# Patient Record
Sex: MALE | Race: ASIAN
[De-identification: names, ages, dates, MRNs, and addresses within clinical notes are randomized per-mention and may not be internally consistent; named-entity substitution may affect disease eponyms.]

---
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
## 2022-06-20 ENCOUNTER — HOSPITAL ENCOUNTER (EMERGENCY)
Dept: HOSPITAL 47 - EC | Age: 22
Discharge: HOME | End: 2022-06-20
Payer: COMMERCIAL

## 2022-06-20 VITALS
HEART RATE: 79 BPM | SYSTOLIC BLOOD PRESSURE: 129 MMHG | DIASTOLIC BLOOD PRESSURE: 79 MMHG | RESPIRATION RATE: 20 BRPM | TEMPERATURE: 98.7 F

## 2022-06-20 DIAGNOSIS — U07.1: Primary | ICD-10-CM

## 2022-06-20 DIAGNOSIS — J02.8: ICD-10-CM

## 2022-06-20 DIAGNOSIS — E66.9: ICD-10-CM

## 2022-06-20 PROCEDURE — 87502 INFLUENZA DNA AMP PROBE: CPT

## 2022-06-20 PROCEDURE — 87081 CULTURE SCREEN ONLY: CPT

## 2022-06-20 PROCEDURE — 99283 EMERGENCY DEPT VISIT LOW MDM: CPT

## 2022-06-20 PROCEDURE — 71046 X-RAY EXAM CHEST 2 VIEWS: CPT

## 2022-06-20 PROCEDURE — 87635 SARS-COV-2 COVID-19 AMP PRB: CPT

## 2022-06-20 PROCEDURE — 87430 STREP A AG IA: CPT

## 2022-06-20 NOTE — XR
EXAM:

  XR Chest, 2 Views

 

CLINICAL HISTORY:

  ITS.REASON XR Reason: Cough/pain

 

TECHNIQUE:

  Frontal and lateral views of the chest.

 

COMPARISON:

  No relevant prior studies available.

 

FINDINGS:

  Lungs:  Unremarkable.  No infiltration, atelectasis or mass density.

  Pleural space:  Unremarkable.  No pneumothorax. No pleural fluid.

  Heart:  Unremarkable.  No cardiomegaly.

  Mediastinum:  Unremarkable.

  Bones/joints:  Unremarkable. No acute abnormalities.

 

IMPRESSION:     

No evidence of acute or active process in the chest.

## 2022-06-20 NOTE — ED
Fever HPI





- General


Chief Complaint: Fever


Stated Complaint: fever, lethargic


Source: patient, family


Mode of arrival: ambulatory


Limitations: no limitations





- History of Present Illness


Initial Comments: 





22-year-old male presents emergency department with fever, sore throat and 

muscle aches.  States that his symptoms just started today.  He denies having 

any direct sick contacts.  Admits to ear pain, scratchy throat, nonproductive 

cough.  He denies chest pain or difficulty breathing.  No abdominal pain.  No 

nausea, vomiting or diarrhea.  He is vaccinated against Covid and has had a 

booster.  Patient did not take any Motrin or Tylenol for fever control prior to 

coming to the hospital.  Patient denies any previous medical history.  No other 

alleviating, precipitating or modifying factors





- Related Data


                                  Previous Rx's











 Medication  Instructions  Recorded


 


Nirmatrelvir/Ritonavir [Paxlovid 1 each PO BID #1 pack 06/20/22





2X150 mg-100 mg ([facility])]  


 


Lidocaine Viscous [Xylocaine 15 ml PO Q4H PRN #100 ml 06/23/22





Viscous 2%]  











                                    Allergies











Allergy/AdvReac Type Severity Reaction Status Date / Time


 


No Known Allergies Allergy   Verified 06/20/22 00:43














Review of Systems


ROS Statement: 


Those systems with pertinent positive or pertinent negative responses have been 

documented in the HPI.





ROS Other: All systems not noted in ROS Statement are negative.





Past Medical History


Past Medical History: No Reported History


History of Any Multi-Drug Resistant Organisms: None Reported


Past Surgical History: No Surgical Hx Reported


Past Psychological History: No Psychological Hx Reported


Smoking Status: Never smoker


Past Alcohol Use History: None Reported


Past Drug Use History: None Reported





General Exam


Limitations: no limitations


General appearance: alert, in no apparent distress, obese


Head exam: Present: atraumatic, normocephalic, normal inspection


Eye exam: Present: normal appearance, PERRL, EOMI.  Absent: scleral icterus, 

conjunctival injection, periorbital swelling


ENT exam: Present: normal exam, mucous membranes moist, other (tonsils and uvula

are absent)


Neck exam: Present: normal inspection.  Absent: tenderness, meningismus, 

lymphadenopathy


Respiratory exam: Present: normal lung sounds bilaterally.  Absent: respiratory 

distress, wheezes, rales, rhonchi, stridor


Cardiovascular Exam: Present: regular rate, normal rhythm, normal heart sounds. 

Absent: systolic murmur, diastolic murmur, rubs, gallop, clicks


GI/Abdominal exam: Present: soft, normal bowel sounds.  Absent: distended, 

tenderness, guarding, rebound, rigid


Extremities exam: Present: normal inspection, full ROM, normal capillary refill.

 Absent: tenderness, pedal edema, joint swelling, calf tenderness


Back exam: Present: normal inspection


Neurological exam: Present: alert, oriented X3, CN II-XII intact


Psychiatric exam: Present: normal affect, normal mood


Skin exam: Present: warm, dry, intact, normal color.  Absent: rash





Course


                                   Vital Signs











  06/20/22 06/20/22





  00:37 03:15


 


Temperature 99.7 F H 98.7 F


 


Pulse Rate 86 79


 


Respiratory 18 20





Rate  


 


Blood Pressure 123/75 129/79


 


O2 Sat by Pulse 97 97





Oximetry  














Medical Decision Making





- Medical Decision Making





Upon arrival patient is placed into room 9.  He is given Motrin for fever 

control.  Patient is swabbed for Covid influenza as well as strep.  Covid is 

positive.  Chest x-ray is performed which shows no acute process.  Patient is 

given antibody infusion.  Family is additionally requesting prescription for 

Paxlovid.  Symptoms controlled at this time.  No signs of respiratory distress. 

Patient will be discharged home and instructed follow up with primary care 

doctor in 2-4 days return for any new or worsening symptoms per patient was 

discharged home in stable condition





- Lab Data


                                   Lab Results











  06/20/22 06/20/22 06/20/22 Range/Units





  01:15 01:15 01:15 


 


Coronavirus (PCR)   Detected A   (Not Detectd)  


 


Influenza Type A RNA  Not Detected    (Not Detectd)  


 


Influenza Type B (PCR)  Not Detected    (Not Detectd)  


 


Group A Strep Rapid    Negative  (Negative)  














Disposition


Clinical Impression: 


 Pyrexia, COVID-19, Pharyngitis





Disposition: HOME SELF-CARE


Condition: Stable


Instructions (If sedation given, give patient instructions):  Coronavirus 

Disease 2019 (COVID-19)


Additional Instructions: 


Please alternates taking Motrin and Tylenol for fever and body aches.  Follow-up

with primary care doctor in 2-4 days and return for any new or worsening 

symptoms


Prescriptions: 


Nirmatrelvir/Ritonavir [Paxlovid 2X150 mg-100 mg ([facility])] 1 each PO BID #1 pack


Is patient prescribed a controlled substance at d/c from ED?: No


Referrals: 


Ramsey Rosas MD [Primary Care Provider] - 1-2 days


Time of Disposition: 02:38

## 2022-06-23 ENCOUNTER — HOSPITAL ENCOUNTER (EMERGENCY)
Dept: HOSPITAL 47 - EC | Age: 22
Discharge: HOME | End: 2022-06-23
Payer: COMMERCIAL

## 2022-06-23 VITALS
TEMPERATURE: 98 F | HEART RATE: 83 BPM | DIASTOLIC BLOOD PRESSURE: 83 MMHG | SYSTOLIC BLOOD PRESSURE: 123 MMHG | RESPIRATION RATE: 18 BRPM

## 2022-06-23 DIAGNOSIS — U07.1: Primary | ICD-10-CM

## 2022-06-23 DIAGNOSIS — J02.9: ICD-10-CM

## 2022-06-23 PROCEDURE — 87081 CULTURE SCREEN ONLY: CPT

## 2022-06-23 PROCEDURE — 87430 STREP A AG IA: CPT

## 2022-06-23 PROCEDURE — 99283 EMERGENCY DEPT VISIT LOW MDM: CPT

## 2022-06-23 NOTE — ED
General Adult HPI





- General


Chief complaint: ENT


Stated complaint: Covid+, sore throat


Time Seen by Provider: 06/23/22 06:50


Source: patient, family, RN notes reviewed, old records reviewed


Mode of arrival: ambulatory


Limitations: no limitations





- History of Present Illness


Initial comments: 





Well-appearing 22-year-old male presents to the emergency room with his father 

complaining of sore throat, increased mucus, left ear pain and difficulty 

sleeping.  He was diagnosed with coronavirus on June 20 and received monoclonal 

antibodies infusion here.  He did take Tylenol prior to arrival. 


-: days(s) (3)


Severity scale (1-10): 8


Quality: constant


Consistency: constant


Improves with: none


Associated Symptoms: other (left ear pain)


Treatments Prior to Arrival: other (tylenol )





- Related Data


                                  Previous Rx's











 Medication  Instructions  Recorded


 


Nirmatrelvir/Ritonavir [Paxlovid 1 each PO BID #1 pack 06/20/22





2X150 mg-100 mg ([facility])]  


 


Lidocaine Viscous [Xylocaine 15 ml PO Q4H PRN #100 ml 06/23/22





Viscous 2%]  











                                    Allergies











Allergy/AdvReac Type Severity Reaction Status Date / Time


 


No Known Allergies Allergy   Verified 06/20/22 00:43














Review of Systems


ROS Statement: 


Those systems with pertinent positive or pertinent negative responses have been 

documented in the HPI.





ROS Other: All systems not noted in ROS Statement are negative.





Past Medical History


Past Medical History: No Reported History


History of Any Multi-Drug Resistant Organisms: None Reported


Past Surgical History: No Surgical Hx Reported


Past Psychological History: No Psychological Hx Reported


Smoking Status: Never smoker


Past Alcohol Use History: None Reported


Past Drug Use History: None Reported





General Exam


Limitations: no limitations


General appearance: alert


Head exam: Present: atraumatic


Eye exam: Present: normal appearance.  Absent: scleral icterus, conjunctival 

injection, periorbital swelling


ENT exam: Present: mucous membranes moist, TM's normal bilaterally, normal 

external ear exam


  ** Expanded


Mouth exam: Present: tongue normal, tongue elevation.  Absent: drooling, 

trismus, muffled voice


Throat exam: other (Erythematous oropharynx).  negative: tonsillar exudate, R 

peritonsillar mass, L peritonsillar mass


Neck exam: Present: normal inspection, full ROM.  Absent: tenderness, 

meningismus, lymphadenopathy


Respiratory exam: Present: normal lung sounds bilaterally.  Absent: respiratory 

distress, accessory muscle use


Cardiovascular Exam: Present: regular rate


Extremities exam: Present: normal capillary refill


Back exam: Present: normal inspection.  Absent: tenderness, CVA tenderness (R), 

CVA tenderness (L), rash noted


Neurological exam: Present: alert, oriented X3


Psychiatric exam: Present: normal affect, normal mood


Skin exam: Present: warm, dry, normal color.  Absent: cyanosis, diaphoretic, 

petechiae, pallor





Course


                                   Vital Signs











  06/23/22





  06:20


 


Temperature 98 F


 


Pulse Rate 83


 


Respiratory 18





Rate 


 


Blood Pressure 123/83


 


O2 Sat by Pulse 98





Oximetry 














Medical Decision Making





- Medical Decision Making





Patient presents with sore throat, increased mucous production and difficulty 

sleeping after diagnosis of Covid 3 days ago.  Patient and father requesting 

another throat swab which was performed and negative for strep.  I explained to 

the patient at length that this viral illness may take a several days to recover

from.  He was given monoclonal antibodies at his previous ER visit.  


Patient is in no acute distress.  Tympanic membranes are clear, oropharynx is 

erythematous, lung sounds are clear to auscultation oxygen saturation is 98% on 

room air.


Family at bedside states they spoke with Dr. Navarrete regarding care, requesting a

medicated gargle for his sore throat. He was given a dose of Decadron and 

Mucinex in the emergency room in addition to a Maalox / viscous lidocaine 

cocktail.  He was given a prescription for lidocaine and was directed to use 1 

tablespoon swish and gargle every 4 hours while awake but not to swallow.  He 

was also directed to increase his fluid intake and use Mucinex.  Follow up with 

his primary care doctor next week and return to the emergency room with any new 

or concerning symptoms. Patient is agreeable to this plan of care.  Case was 

discussed with Dr. Galicia.





- Lab Data


                                   Lab Results











  06/23/22 Range/Units





  07:16 


 


Group A Strep Rapid  Negative  (Negative)  














Disposition


Clinical Impression: 


 COVID-19, Pharyngitis





Disposition: HOME SELF-CARE


Condition: Good


Instructions (If sedation given, give patient instructions):  Coronavirus 

Disease 2019 (COVID-19), Pharyngitis (ED)


Prescriptions: 


Lidocaine Viscous [Xylocaine Viscous 2%] 15 ml PO Q4H PRN #100 ml


 PRN Reason: Pain


Is patient prescribed a controlled substance at d/c from ED?: No


Referrals: 


Ramsey Rosas MD [Primary Care Provider] - 1-2 days


Time of Disposition: 08:05

## 2025-05-08 ENCOUNTER — HOSPITAL ENCOUNTER (EMERGENCY)
Dept: HOSPITAL 47 - EC | Age: 25
LOS: 1 days | Discharge: HOME | End: 2025-05-09
Payer: COMMERCIAL

## 2025-05-08 VITALS — RESPIRATION RATE: 18 BRPM | SYSTOLIC BLOOD PRESSURE: 124 MMHG | HEART RATE: 89 BPM | DIASTOLIC BLOOD PRESSURE: 80 MMHG

## 2025-05-08 VITALS — TEMPERATURE: 98.5 F

## 2025-05-08 DIAGNOSIS — K29.70: Primary | ICD-10-CM

## 2025-05-08 PROCEDURE — 36415 COLL VENOUS BLD VENIPUNCTURE: CPT

## 2025-05-08 PROCEDURE — 99284 EMERGENCY DEPT VISIT MOD MDM: CPT

## 2025-05-08 PROCEDURE — 74018 RADEX ABDOMEN 1 VIEW: CPT

## 2025-05-08 PROCEDURE — 82272 OCCULT BLD FECES 1-3 TESTS: CPT

## 2025-05-08 RX ADMIN — FAMOTIDINE STA MG: 20 TABLET, FILM COATED ORAL at 23:42

## 2025-05-08 RX ADMIN — DIMETHICONE STA MG: 80 TABLET, CHEWABLE ORAL at 23:42

## 2025-05-08 RX ADMIN — MAGESIUM CITRATE ONE ML: 1.75 LIQUID ORAL at 22:24

## 2025-05-08 RX ADMIN — LIDOCAINE HYDROCHLORIDE ONE ML: 20 SOLUTION ORAL; TOPICAL at 23:44
